# Patient Record
Sex: FEMALE | Race: WHITE | NOT HISPANIC OR LATINO | URBAN - METROPOLITAN AREA
[De-identification: names, ages, dates, MRNs, and addresses within clinical notes are randomized per-mention and may not be internally consistent; named-entity substitution may affect disease eponyms.]

---

## 2019-10-07 ENCOUNTER — OUTPATIENT (OUTPATIENT)
Dept: OUTPATIENT SERVICES | Facility: HOSPITAL | Age: 69
LOS: 1 days | Discharge: HOME | End: 2019-10-07

## 2019-10-09 LAB — SURGICAL PATHOLOGY STUDY: SIGNIFICANT CHANGE UP

## 2024-05-28 ENCOUNTER — EMERGENCY (EMERGENCY)
Facility: HOSPITAL | Age: 74
LOS: 1 days | Discharge: HOME | End: 2024-05-28
Attending: STUDENT IN AN ORGANIZED HEALTH CARE EDUCATION/TRAINING PROGRAM | Admitting: STUDENT IN AN ORGANIZED HEALTH CARE EDUCATION/TRAINING PROGRAM
Payer: MEDICARE

## 2024-05-28 VITALS
TEMPERATURE: 97 F | HEART RATE: 76 BPM | SYSTOLIC BLOOD PRESSURE: 102 MMHG | WEIGHT: 121.92 LBS | OXYGEN SATURATION: 96 % | HEIGHT: 61 IN | RESPIRATION RATE: 16 BRPM | DIASTOLIC BLOOD PRESSURE: 67 MMHG

## 2024-05-28 VITALS
TEMPERATURE: 97 F | RESPIRATION RATE: 18 BRPM | DIASTOLIC BLOOD PRESSURE: 66 MMHG | OXYGEN SATURATION: 96 % | SYSTOLIC BLOOD PRESSURE: 105 MMHG | HEART RATE: 77 BPM

## 2024-05-28 PROCEDURE — 73090 X-RAY EXAM OF FOREARM: CPT | Mod: 26,RT

## 2024-05-28 PROCEDURE — 99285 EMERGENCY DEPT VISIT HI MDM: CPT

## 2024-05-28 PROCEDURE — 99285 EMERGENCY DEPT VISIT HI MDM: CPT | Mod: 25

## 2024-05-28 PROCEDURE — 73502 X-RAY EXAM HIP UNI 2-3 VIEWS: CPT

## 2024-05-28 PROCEDURE — 73590 X-RAY EXAM OF LOWER LEG: CPT

## 2024-05-28 PROCEDURE — 73110 X-RAY EXAM OF WRIST: CPT | Mod: 26,RT

## 2024-05-28 PROCEDURE — 73502 X-RAY EXAM HIP UNI 2-3 VIEWS: CPT | Mod: 26,RT

## 2024-05-28 PROCEDURE — 73110 X-RAY EXAM OF WRIST: CPT

## 2024-05-28 PROCEDURE — 73090 X-RAY EXAM OF FOREARM: CPT

## 2024-05-28 PROCEDURE — 99283 EMERGENCY DEPT VISIT LOW MDM: CPT

## 2024-05-28 PROCEDURE — 73130 X-RAY EXAM OF HAND: CPT

## 2024-05-28 PROCEDURE — 73564 X-RAY EXAM KNEE 4 OR MORE: CPT | Mod: 26,RT

## 2024-05-28 PROCEDURE — 73130 X-RAY EXAM OF HAND: CPT | Mod: 26,RT

## 2024-05-28 PROCEDURE — 25605 CLTX DST RDL FX/EPHYS SEP W/: CPT | Mod: RT

## 2024-05-28 PROCEDURE — 73552 X-RAY EXAM OF FEMUR 2/>: CPT | Mod: 26,RT

## 2024-05-28 PROCEDURE — 73552 X-RAY EXAM OF FEMUR 2/>: CPT

## 2024-05-28 PROCEDURE — 73564 X-RAY EXAM KNEE 4 OR MORE: CPT

## 2024-05-28 PROCEDURE — 73590 X-RAY EXAM OF LOWER LEG: CPT | Mod: 26,RT

## 2024-05-28 NOTE — ED ADULT TRIAGE NOTE - CHIEF COMPLAINT QUOTE
s/p mechanical fall in the park c/o right wrist and right knee pain with abrasions. Denies hitting head/LOC/anticoagulants. BP 90's/60's in field and 102/67 in ED. normal...

## 2024-05-28 NOTE — ED PROVIDER NOTE - NS ED ROS FT
Positive: Right wrist pain, rating pain, fall Positive: Right wrist pain, R knee pain, fall  negative: f/c/congestion/cough/cp/sob/abdominal pain/n/v/d/dysuria/hematuria/leg edema/rash/loc/numbness/weakness

## 2024-05-28 NOTE — ED ADULT NURSE NOTE - NSFALLRISKINTERV_ED_ALL_ED

## 2024-05-28 NOTE — ED PROVIDER NOTE - NSFOLLOWUPINSTRUCTIONS_ED_ALL_ED_FT
Do not bear weight on your right arm.     Please reach out to Hortencia Bradley ([facility] ED clinical referral coordinator) to assist you with your follow-up appointment.     Monday - Friday 11am-7pm  [phone]  [email or web address]    1. You were seen for wrist pain. A copy of any of your resulted labs, imaging, and findings have been provided to you. Make sure to view any test results that may not have yet resulted at the time of your discharge by creating a FollowMyHealth account at: [email or web address] to sign up for FollowMyHealth. Please review all of your lab, imaging, and all other results in their entirety with your primary care doctor.   2. Continue to take your home medications as prescribed. Take over the counter motrin 600 mg with food every six hours (do not exceed 3,200 mg in a 24 hour period) and supplement with tylenol 650 mg every six hours (do not exceed 4000 mg of tylenol in a 24 hour period and do not drink alcohol while taking tylenol) between the motrin dosages to have a layered effect. Consult a pharmacist or your primary care doctor with any questions.   3. Follow up an orthopedic doctor (Dr. Frederick or Dr. Blanca) with your primary care doctor within 48 hours to assess the symptoms you were seen for in the emergency department and to review all results from your visit. If you don't have a doctor, call [phone] to make an appointment.  4. Return immediately to the emergency department for new, persistent, or worsening symptoms or signs. Return immediately to the emergency department if you have chest pain, shortness of breath, loss of consciousness, discoloration, increased pain, numbness, or weakness.   5. For your for health, you should make healthy food choices and be physically active. Also, you should not smoke or use drugs, and you should not drink alcohol in excess. Please visit [email or web address] for resources and more information.

## 2024-05-28 NOTE — ED PROVIDER NOTE - OBJECTIVE STATEMENT
73-year-old female  right hand dominant with past medical history of  osteoporosis, hyperlipidemia, hypothyroidism, past surgical history of hernia repair  presents with right wrist pain and right knee pain that started today after she sustained a mechanical ground-level fall landing on her right side.  States she does not recall striking her head.  Denies LOC.  Denies blood thinner use.  Denies numbness or weakness.   States someone it was in the weight when she was walking and she tripped over uneven pavement.

## 2024-05-28 NOTE — ED PROVIDER NOTE - IV ALTEPLASE DOOR HIDDEN
Health Maintenance Due   Topic Date Due   • Pneumococcal Vaccine 0-64 (1 of 1 - PPSV23) 05/24/2002       Patient is due for topics as listed above but is not proceeding with Immunization(s) Pneumococcal and Rotavirus at this time.          show

## 2024-05-28 NOTE — ED ADULT NURSE NOTE - CHIEF COMPLAINT QUOTE
s/p mechanical fall in the park c/o right wrist and right knee pain with abrasions. Denies hitting head/LOC/anticoagulants. BP 90's/60's in field and 102/67 in ED.

## 2024-05-28 NOTE — ED ADULT TRIAGE NOTE - TEMP AT ED ARRIVAL (C)
HR=89 bpm, BCQK=991/118 mmhg, SpO2=98.0 %, Resp=15 B/min, EtCO2=27 mmHg, Apnea=2 Seconds, Nuñez=2 36.2

## 2024-05-28 NOTE — ED PROVIDER NOTE - PATIENT PORTAL LINK FT
You can access the FollowMyHealth Patient Portal offered by [facility] by registering at the following website: [email or web address]. By joining [employer]’s FollowMyHealth portal, you will also be able to view your health information using other applications (apps) compatible with our system.

## 2024-05-28 NOTE — ED PROVIDER NOTE - PHYSICAL EXAMINATION
GENERAL:  Awake, alert and in NAD, non-toxic appearing  ENMT: Airway patent  EYES: conjunctiva clear  CARDIAC: Regular rate, regular rhythm.  Heart sounds S1, S2, no S3, S4. No murmurs, rubs or gallops.  RESPIRATORY: Breath sounds clear and equal in bilateral anterior lung fields, no wheezes/ronchi/crackles/stridor; pt breathing and speaking comfortably with no increased WOB, no accessory mm. use, no intercostal retractions, no nasal flaring  GI: abdomen soft, non-distended, non-tender, no rebound or guarding.  SKIN: warm and dry, no rashes  PSYCH: awake, alert, calm and cooperative  EXTREMITIES: no ble edema  BUE: 5/5 motor strength bilat deltoid, biceps, triceps, LUE wrist flexors/extensors, hand . RUE: limited rom and limited ability to evaluate strength RUE 2/2 pain, sensation intact to light touch bilat C5-T1; 2+ radial pulses bilat; compartment soft, skin warm and dry overlying superficial abrasion on R posterior wrist,, no snuff box/axial loading ttp  R knee: superficial abrasion, FROM R knee  NEURO: gcs 15

## 2024-05-28 NOTE — ED ADULT NURSE NOTE - OBJECTIVE STATEMENT
Pt received s/p trip and fall incident in the park. pt has abrasions noted to the right wrist and right knee. Pt denies any head strike, LOC. Pt not on AC. Pt denies any dizziness, SOB, or CP.

## 2024-05-28 NOTE — ED PROVIDER NOTE - NSCAREINITIATED _GEN_ER
Critical Care Hospitalist Infectious Disease Infectious Disease Palliative Care Hospitalist Nephrology Nephrology Infectious Disease Nephrology Nhung Adkins(Attending)

## 2024-05-28 NOTE — ED PROVIDER NOTE - CLINICAL SUMMARY MEDICAL DECISION MAKING FREE TEXT BOX
73-year-old female  right hand dominant with past medical history of  osteoporosis, hyperlipidemia, hypothyroidism, past surgical history of hernia repair  presents with right wrist pain and right knee pain that started today after she sustained a mechanical ground-level fall landing on her right side. On exam, /66, VS otherwise wnl, superficial abrasion over R wrist and R knee, limited rom and ability to evaluate strength R wrist / hand  2/2 pain, otherwise NVI.    ddx: fx vs dislocation vs sprain vs strain    Plan:  - XR RUE: IMPRESSION:    Mild volar angulation of the intra-articular fracture of the distal   radius.  - XR RLE: IMPRESSION:  No acute displaced fracture.   - XR R knee: IMPRESSION:  No acute osseous abnormality.  - pt declines pain meds  - ortho consulted and evaluated pt at beside splinted R wrist. recommend NWB and f/u with ortho outpatient ok to dc

## 2024-05-28 NOTE — CONSULT NOTE ADULT - SUBJECTIVE AND OBJECTIVE BOX
Orthopaedic Surgery Consult Note  For Surgeon: Dr. Nicholas    HPI:  73y old  Female         Allergies  No Known Allergies  Intolerances      PAST MEDICAL & SURGICAL HISTORY:  Hypothyroidism          Vital Signs Last 24 Hrs  T(C): 36.3 (28 May 2024 17:50), Max: 36.3 (28 May 2024 17:50)  T(F): 97.4 (28 May 2024 17:50), Max: 97.4 (28 May 2024 17:50)  HR: 77 (28 May 2024 17:50) (76 - 77)  BP: 105/66 (28 May 2024 17:50) (102/67 - 105/66)  BP(mean): --  RR: 18 (28 May 2024 17:50) (16 - 18)  SpO2: 96% (28 May 2024 17:50) (96% - 96%)    Parameters below as of 28 May 2024 17:50  Patient On (Oxygen Delivery Method): room air        Physical Exam:              Imaging:     A/P: 73yFemale    -Discussed with Dr. Hudson Pager [phone]   Orthopaedic Surgery Consult Note  For Surgeon: Dr. Nicholas    HPI:  73y old Female s/p ground level fall onto outstretched hand resulting in R distal radius fracture.  Pt presents today with symptoms of R wrist pain.?    Denies HS/LOC. C/o?persistent?wrist pain without any new weakness/n/t/p.   No other MSK complaints.        PMHx -  osteoporosis, hyperlipidemia, hypothyroidism  PSx -  Hernia repair  Social (-)Tobacco (-)EtOh (-)Elicit substance     Allergies  No Known Allergies  Intolerances      Vital Signs Last 24 Hrs  T(C): 36.3 (28 May 2024 17:50), Max: 36.3 (28 May 2024 17:50)  T(F): 97.4 (28 May 2024 17:50), Max: 97.4 (28 May 2024 17:50)  HR: 77 (28 May 2024 17:50) (76 - 77)  BP: 105/66 (28 May 2024 17:50) (102/67 - 105/66)  BP(mean): --  RR: 18 (28 May 2024 17:50) (16 - 18)  SpO2: 96% (28 May 2024 17:50) (96% - 96%)    Parameters below as of 28 May 2024 17:50  Patient On (Oxygen Delivery Method): room air        Physical Exam:  AVSS   Gen: NAD, AOx3   Right?Upper Extremity?   No open injuries - small superficial abrasion over the dorsal R wrist  +ttp, swelling over the?R?wrist  Painless ROM of all other extremities. No elbow,arm, or shoulder pain on injured side.    AIN/PIN/U/Med/R ?intact to motor   SILT M/R/U/Ax   Palpable radial and ulnar pulses   Brisk cap refill distally   Compartments soft and compressible       Imaging: Mild volar angulation of the intra-articular fracture of the distal radius.    Procedure: R wrist was prepped in a sterile fashion. Using aseptic technique R wrist hematom?block performed bedside with?10cc of lidocaine without epi. Patient tolerated procedure well. Wrist reduced at bedside and a sugar tong splint  applied post reduction Post splinting, pt was nvi     A/P: 73yFemale s/p ground level fall resulting in R  distal radius fracture    -NWB RUE - no lifting   -Sling for comfort  -Splint precautions  -Ice as able for swelling   -PO Pain Rx per ED  Follow up with orthopaedics within 1 week.    -Discussed with Dr. Ley    Ortho Pager [phone]

## 2024-05-28 NOTE — ED PROVIDER NOTE - CARE PROVIDER_API CALL
Dipesh Nicholas  Orthopaedic Sports Medicine  [address]  [place] [address]  Phone: [phone]  Fax: [phone]  Follow Up Time: 1-3 Days

## 2024-05-28 NOTE — CONSULT NOTE ADULT - ATTENDING COMMENTS
74 yo female with displaced volar distal radius fracture.  Acceptable reduction but unstable pattern.  Will followup with myself or hand surgery team outpatient to discuss surgical vs nonsurgical definitiive management after period of rest and elevation, followup radiographs.
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
## 2024-05-30 DIAGNOSIS — M81.0 AGE-RELATED OSTEOPOROSIS WITHOUT CURRENT PATHOLOGICAL FRACTURE: ICD-10-CM

## 2024-05-30 DIAGNOSIS — Z98.890 OTHER SPECIFIED POSTPROCEDURAL STATES: ICD-10-CM
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
## 2024-05-30 DIAGNOSIS — M25.561 PAIN IN RIGHT KNEE: ICD-10-CM

## 2024-05-30 DIAGNOSIS — E03.9 HYPOTHYROIDISM, UNSPECIFIED: ICD-10-CM

## 2024-05-30 DIAGNOSIS — S52.501A UNSPECIFIED FRACTURE OF THE LOWER END OF RIGHT RADIUS, INITIAL ENCOUNTER FOR CLOSED FRACTURE: ICD-10-CM

## 2024-05-30 DIAGNOSIS — M25.531 PAIN IN RIGHT WRIST: ICD-10-CM

## 2024-05-30 DIAGNOSIS — E78.5 HYPERLIPIDEMIA, UNSPECIFIED: ICD-10-CM

## 2024-08-07 NOTE — ED ADULT TRIAGE NOTE - ARRIVAL FROM
Care assumed of pt. Pt brought in by EMS from home for c/o abd pain. Pt receives HD Tues/Thurs/Sat, fistula to LUE. Pt writhing around in bed, moaning in pain. No vomiting noted. Pt difficult IV access. Jb, RN at bedside attempting ultrasound IV. Unable to obtain BP at this time d/t pt's inability to sit still.    Street

## 2025-02-12 NOTE — ED ADULT TRIAGE NOTE - TEMPERATURE IN CELSIUS (DEGREES C)
Physical Therapy Visit         OBJECTIVE                                                                                                                                         Treatment     Therapeutic Exercise  Passive range of motion and stretching to left elbow/forearm   - left elbow extension. Palm up, down, neutral   - supination and pronation   - shoulder flexion  Nerve glides x 10 repetitions: median, ulnar, radial   Wrist stretching into flexion and extension (elbow extended) 2 x 30 seconds each   Wall slide 3 x 5 - palm up, palm neutral, palm down   Tricep extension isometric ball at wall 5x3 seconds: palm up, neural, palm down   Isometric  supination standing ball at wall x 10 (elbow extended)  Landmine press x10 - good extension     Next:  [employer]     Future (once elbow extension obtained):  Shoulder extension palm down with pulley  Shoulder extension prone - palm up, palm down       Manual Therapy   Soft tissue mobilization to extensor mass, supinator, tricep distal  - left     Neuromuscular Re-Education  Scap pinch external rotation x 5, yellow band x 10   Rows 2 plates x 10     Skilled input: verbal instruction/cues, tactile instruction/cues and demonstration    Writer verbally educated and received verbal consent for hand placement, positioning of patient, and techniques to be performed today from patient for hand placement and palpation for techniques, clothing adjustments for techniques and therapist position for techniques as described above and how they are pertinent to the patient's plan of care.  Home Exercise Program  Access Code: [email or web address]  URL: [email or web address]  Date: 02/05/2025  Prepared by: Magda Strong    Exercises  - Standing Wrist Flexor Stretch with Arm Bent  - 1 x daily - 7 x weekly - 3 sets - 30 seconds hold  - Standing Wrist Extensor Stretch with Arm Bent  - 1 x daily - 7 x weekly - 3 sets - 30 seconds hold  - Shoulder Flexion Wall Slide with  Towel  - 1 x daily - 7 x weekly - 3 sets - 10 reps  - Shoulder External Rotation and Scapular Retraction with Resistance  - 1 x daily - 3-5 x weekly - 3 sets - 10 reps  - Seated Isometric Forearm Supination  - 1 x daily - 7 x weekly - 3 sets - 10 reps          Therapy procedure time and total treatment time can be found documented on the Time Entry flowsheet     36.2